# Patient Record
Sex: FEMALE
[De-identification: names, ages, dates, MRNs, and addresses within clinical notes are randomized per-mention and may not be internally consistent; named-entity substitution may affect disease eponyms.]

---

## 2018-08-02 ENCOUNTER — HOSPITAL ENCOUNTER (EMERGENCY)
Dept: HOSPITAL 5 - ED | Age: 33
Discharge: OTHER INSTITUTION | End: 2018-08-02
Payer: SELF-PAY

## 2018-08-02 VITALS — SYSTOLIC BLOOD PRESSURE: 131 MMHG | DIASTOLIC BLOOD PRESSURE: 83 MMHG

## 2018-08-02 DIAGNOSIS — I96: Primary | ICD-10-CM

## 2018-08-02 DIAGNOSIS — Z87.891: ICD-10-CM

## 2018-08-02 DIAGNOSIS — F15.10: ICD-10-CM

## 2018-08-02 LAB
ALBUMIN SERPL-MCNC: 2.9 G/DL (ref 3.9–5)
ALT SERPL-CCNC: 63 UNITS/L (ref 7–56)
APTT BLD: 26.8 SEC. (ref 24.2–36.6)
BASOPHILS # (AUTO): 0.1 K/MM3 (ref 0–0.1)
BASOPHILS NFR BLD AUTO: 1.3 % (ref 0–1.8)
BUN SERPL-MCNC: 30 MG/DL (ref 7–17)
BUN/CREAT SERPL: 33 %
CALCIUM SERPL-MCNC: 8.7 MG/DL (ref 8.4–10.2)
CRP SERPL-MCNC: 3.6 MG/DL (ref 0–1.3)
EOSINOPHIL # BLD AUTO: 0.2 K/MM3 (ref 0–0.4)
EOSINOPHIL NFR BLD AUTO: 3.3 % (ref 0–4.3)
HCT VFR BLD CALC: 28.2 % (ref 30.3–42.9)
HEMOLYSIS INDEX: 0
HGB BLD-MCNC: 9.1 GM/DL (ref 10.1–14.3)
INR PPP: 0.98 (ref 0.87–1.13)
LYMPHOCYTES # BLD AUTO: 1.5 K/MM3 (ref 1.2–5.4)
LYMPHOCYTES NFR BLD AUTO: 29.6 % (ref 13.4–35)
MCH RBC QN AUTO: 30 PG (ref 28–32)
MCHC RBC AUTO-ENTMCNC: 32 % (ref 30–34)
MCV RBC AUTO: 91 FL (ref 79–97)
MONOCYTES # (AUTO): 0.4 K/MM3 (ref 0–0.8)
MONOCYTES % (AUTO): 7 % (ref 0–7.3)
PLATELET # BLD: 142 K/MM3 (ref 140–440)
RBC # BLD AUTO: 3.1 M/MM3 (ref 3.65–5.03)

## 2018-08-02 PROCEDURE — 36415 COLL VENOUS BLD VENIPUNCTURE: CPT

## 2018-08-02 PROCEDURE — 73630 X-RAY EXAM OF FOOT: CPT

## 2018-08-02 PROCEDURE — 80053 COMPREHEN METABOLIC PANEL: CPT

## 2018-08-02 PROCEDURE — 85730 THROMBOPLASTIN TIME PARTIAL: CPT

## 2018-08-02 PROCEDURE — 85025 COMPLETE CBC W/AUTO DIFF WBC: CPT

## 2018-08-02 PROCEDURE — 73130 X-RAY EXAM OF HAND: CPT

## 2018-08-02 PROCEDURE — 82140 ASSAY OF AMMONIA: CPT

## 2018-08-02 PROCEDURE — 71045 X-RAY EXAM CHEST 1 VIEW: CPT

## 2018-08-02 PROCEDURE — 85610 PROTHROMBIN TIME: CPT

## 2018-08-02 PROCEDURE — 96360 HYDRATION IV INFUSION INIT: CPT

## 2018-08-02 PROCEDURE — 84703 CHORIONIC GONADOTROPIN ASSAY: CPT

## 2018-08-02 PROCEDURE — 99285 EMERGENCY DEPT VISIT HI MDM: CPT

## 2018-08-02 PROCEDURE — 87040 BLOOD CULTURE FOR BACTERIA: CPT

## 2018-08-02 PROCEDURE — 85652 RBC SED RATE AUTOMATED: CPT

## 2018-08-02 PROCEDURE — 86140 C-REACTIVE PROTEIN: CPT

## 2018-08-02 PROCEDURE — 82550 ASSAY OF CK (CPK): CPT

## 2018-08-02 NOTE — XRAY REPORT
CHEST ONE VIEW



INDICATION: Sepsis.



COMPARISON: None similar at this institution.



FINDINGS: Portable, single, frontal chest radiograph demonstrates 

normal cardiomediastinal silhouette. Clear lungs. Unremarkable bones. 

Extrinsic EKG leads.



CONCLUSION: No acute disease in the chest.



Thank you for the opportunity to participate in this patient's care.

## 2018-08-02 NOTE — XRAY REPORT
BILATERAL HAND RADIOGRAPHS



INDICATION: Necrosis, infection, pain.



COMPARISON: None similar.



FINDINGS: AP, lateral and oblique radiographs of both hands demonstrate 

intact bony articulation and appearance.  No focal suspicious erosions. 

 Soft tissue swelling though suspected involving bilateral fingers as 

also along the dorsum of the left hand.  No radiopaque foreign body.



CONCLUSION: No acute hand bony abnormality with soft tissue swelling 

suspected, as described.  Please correlate.



Thank you for the opportunity to participate in this patient's care.

## 2018-08-02 NOTE — EMERGENCY DEPARTMENT REPORT
HPI





- General


Chief Complaint: Pain General


Time Seen by Provider: 08/02/18 11:41





- HPI


HPI: 


33-year-old  female presents to the emergency department with 

complaint of dark black, necrotic fingers and toes, portions of her feet, as 

well as some dark necrotic-appearing eschars and/or ulcers to the arms and the 

side of her abdomen.  Patient went to [facility] on July 11 

secondary to some type of infection that caused her to go into septic shock.  

The patient was intubated, allegedly had multiorgan failure and was on pressors 

for hypotension.  During her time in the hospital, the patient started 

developing the darkening of the skin and these necrotic extremities.  She was 

discharged last Monday, 3 days ago, and was supposed to follow up outpatient 

with an orthopedist.  They said that they went to see the orthopedist group and 

allegedly they were turned away as that particular orthopedist allegedly was 

not on call for the hospital.  She has a history of IV drug use, crystal meth, 

and last used about 3 weeks ago, just prior to going into the hospital.








ED Past Medical Hx





- Past Medical History


Previous Medical History?: Yes


Additional medical history: sepsis, iv drug use





- Surgical History


Past Surgical History?: No





- Social History


Smoking Status: Former Smoker


Substance Use Type: Methamphetamines





ED Review of Systems


ROS: 


Stated complaint: FINGER/TOES BLACK


Other details as noted in HPI





Comment: All other systems reviewed and negative


Constitutional: denies: chills, fever


Eyes: denies: eye pain, eye discharge, vision change


ENT: denies: ear pain, throat pain


Respiratory: denies: cough, shortness of breath, wheezing


Cardiovascular: denies: chest pain, palpitations


Gastrointestinal: abdominal pain.  denies: vomiting


Genitourinary: denies: urgency, dysuria, discharge


Musculoskeletal: arthralgia, myalgia


Skin: lesions, change in color


Neurological: numbness (in her necrotic toes and fingers).  denies: headache





Physical Exam





- Physical Exam


Vital Signs: 


 Vital Signs











  08/02/18





  11:23


 


Temperature 97.6 F


 


Pulse Rate 106 H


 


Respiratory 18





Rate 


 


Blood Pressure 112/85


 


O2 Sat by Pulse 100





Oximetry 











Physical Exam: 





GENERAL: The patient is well-developed well-nourished.


HENT: Normocephalic.  Atraumatic.    Patient has moist mucous membranes.


EYES: Extraocular motions are intact.  Pupils equal reactive to light 

bilaterally.


NECK: Supple.  Trachea is midline.


CHEST/LUNGS: Clear to auscultation.  There is no respiratory distress noted.


HEART/CARDIOVASCULAR: Regular.  There is mild tachycardia.  There is no murmur.


ABDOMEN: Abdomen is soft, nontender.  Patient has normal bowel sounds.


SKIN: Patient has severe necrosis and/or dry gangrene of the distal one third 

of all 10 fingers with some significant tissue and/or volume loss to the finger 

pads.  Patient also has the same necrosis and gangrene to all 10 toes and the 

distal portion of the foot, worst in the plantar portion.


NEURO: The patient is awake, alert, and oriented.  The patient is cooperative. 

The patient has normal speech.


MUSCULOSKELETAL: Patient has some tenderness palpation to the middle of the 

phalanx to all fingers, just proximal to where the necrosis starts.  Radial 

pulses +2 over 4 bilaterally.





ED Course


 Vital Signs











  08/02/18





  11:23


 


Temperature 97.6 F


 


Pulse Rate 106 H


 


Respiratory 18





Rate 


 


Blood Pressure 112/85


 


O2 Sat by Pulse 100





Oximetry 














- Consultations


Consultation #1: 


I had spoken to our orthopedist on call, Dr. Connolly, who felt the patient 

needed to be transferred to a facility that had availability of orthopedic hand 

surgery as he did not feel comfortable taking care of the patient in case it 

involved the hand. 





 I then spoke with Dr. Herrera, orthopedist at [facility], who did not 

feel that the patient should be admitted to his service but was happy to 

consult if the patient went to the medicine service.  However it turns out that 

[facility] is on saturation for any transfers and they could not accept the 

patient.





I called and spoke with Dr. Alexandre, orthopedic surgeon at [facility], who 

also says that there is no emergent surgery necessary and that the patient 

should not be placed on his service but once again would be happy to consult if 

the patient was accepted to the hospitalist medicine service.  I then spoke 

with Dr. Carlos, the hospitalist, who accepted the patient for transfer.








08/02/18 17:17








ED Medical Decision Making





- Lab Data


Result diagrams: 


 08/02/18 12:01





 08/02/18 12:01





- Radiology Data


Radiology results: image reviewed


interpreted by me: 


X-rays of the bilateral hands and bilateral feet did not show any fracture, 

dislocation, signs of osteomyelitis.  There is obvious soft tissue changes to 

the distal portion of the fingers and the toes.








- Medical Decision Making





Patient presents with some necrosis and/or dry gangrene of the fingers and toes 

and distal feet bilaterally that came from patient's previous hospitalization 

where she had septic shock and prolonged vasopressor usage.  The patient 

presented today with complaint of possible worsening but continued discomfort 

and concern.  Patient does not currently appear septic but does have some 

sustained tachycardia.  The patient was treated empirically with vancomycin.  

As per the consultation section, the patient was eventually accepted to the 

[facility] medicine service with a orthopedic hand surgeon available to 

consult.  Patient was reevaluated multiple times over multiple hours and has 

remained stable throughout her ED course.  Patient was updated regarding her 

labs, imaging and plan for transfer and she understands and agrees.





- Differential Diagnosis


cellulitis, skin necrosis, dry gangrene, arterial occlusion


Critical Care Time: No


Critical care attestation.: 


If time is entered above; I have spent that time in minutes in the direct care 

of this critically ill patient, excluding procedure time.








ED Disposition


Clinical Impression: 


 Dry gangrene, Finger necrosis, Toe necrosis





Disposition: [id number]/TX-70 ANOTHER TYPE HLTHCARE


Is pt being admited?: No


Condition: Serious


Referrals: 


PRIMARY CARE,MD [Primary Care Provider] - 3-5 Days


Time of Disposition: 19:36

## 2018-08-02 NOTE — XRAY REPORT
BILATERAL FEET RADIOGRAPHS



INDICATION: Necrosis, infection, pain.



COMPARISON: None similar.



FINDINGS: AP, lateral and oblique bilateral feet radiographs suggest 

intact bony articulation.  No focal suspicious erosions.  Mild 

bilateral forefoot soft tissue swelling not excluded, more so dorsally. 

 No radiopaque foreign body or definite soft tissue air.  Approximately 

0.6 cm calcifications in the region of posterior talar process 

incidentally noted on the left.



CONCLUSION: No acute bony abnormality with few other findings, as 

above.  Please correlate.



Thank you for the opportunity to participate in this patient's care.